# Patient Record
Sex: MALE | Race: WHITE | NOT HISPANIC OR LATINO | Employment: FULL TIME | URBAN - METROPOLITAN AREA
[De-identification: names, ages, dates, MRNs, and addresses within clinical notes are randomized per-mention and may not be internally consistent; named-entity substitution may affect disease eponyms.]

---

## 2017-11-07 ENCOUNTER — TELEPHONE (OUTPATIENT)
Dept: ORTHOPEDIC SURGERY | Facility: CLINIC | Age: 47
End: 2017-11-07

## 2017-11-07 NOTE — TELEPHONE ENCOUNTER
Probably best to put him in with whichever physician has the first available I OV appointment slot

## 2017-11-07 NOTE — TELEPHONE ENCOUNTER
He is NOT my patient.  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  I did not do his surgery.  I have never seen him before or since.    He has requested to see Dr. Alcazar.  He had, at the time I saw him, had more than one dislocation of the hip.     I will respectfully decline.  I cannot see the patient in a timely fashion within the next 2 weeks, anyway.

## 2017-11-13 ENCOUNTER — OFFICE VISIT (OUTPATIENT)
Dept: ORTHOPEDIC SURGERY | Facility: CLINIC | Age: 47
End: 2017-11-13

## 2017-11-13 VITALS — TEMPERATURE: 98 F | HEIGHT: 70 IN | WEIGHT: 152 LBS | BODY MASS INDEX: 21.76 KG/M2

## 2017-11-13 DIAGNOSIS — S89.92XA LEFT KNEE INJURY, INITIAL ENCOUNTER: Primary | ICD-10-CM

## 2017-11-13 PROCEDURE — 20610 DRAIN/INJ JOINT/BURSA W/O US: CPT | Performed by: ORTHOPAEDIC SURGERY

## 2017-11-13 PROCEDURE — 73562 X-RAY EXAM OF KNEE 3: CPT | Performed by: ORTHOPAEDIC SURGERY

## 2017-11-13 PROCEDURE — 99203 OFFICE O/P NEW LOW 30 MIN: CPT | Performed by: ORTHOPAEDIC SURGERY

## 2017-11-13 RX ORDER — LOSARTAN POTASSIUM 100 MG/1
100 TABLET ORAL
COMMUNITY

## 2017-11-13 RX ORDER — LIDOCAINE HYDROCHLORIDE 10 MG/ML
2 INJECTION, SOLUTION INFILTRATION; PERINEURAL
Status: COMPLETED | OUTPATIENT
Start: 2017-11-13 | End: 2017-11-13

## 2017-11-13 RX ORDER — METHYLPREDNISOLONE ACETATE 80 MG/ML
160 INJECTION, SUSPENSION INTRA-ARTICULAR; INTRALESIONAL; INTRAMUSCULAR; SOFT TISSUE
Status: COMPLETED | OUTPATIENT
Start: 2017-11-13 | End: 2017-11-13

## 2017-11-13 RX ORDER — BUPIVACAINE HYDROCHLORIDE 5 MG/ML
2 INJECTION, SOLUTION PERINEURAL
Status: COMPLETED | OUTPATIENT
Start: 2017-11-13 | End: 2017-11-13

## 2017-11-13 RX ADMIN — BUPIVACAINE HYDROCHLORIDE 2 ML: 5 INJECTION, SOLUTION PERINEURAL at 21:33

## 2017-11-13 RX ADMIN — METHYLPREDNISOLONE ACETATE 160 MG: 80 INJECTION, SUSPENSION INTRA-ARTICULAR; INTRALESIONAL; INTRAMUSCULAR; SOFT TISSUE at 21:33

## 2017-11-13 RX ADMIN — LIDOCAINE HYDROCHLORIDE 2 ML: 10 INJECTION, SOLUTION INFILTRATION; PERINEURAL at 21:33

## 2017-11-14 NOTE — PROGRESS NOTES
Patient: Rosalio Marcelino    YOB: 1970    Medical Record Number: [id number]    Chief Complaints:  Left knee injury    History of Present Illness:     47 y.o. male patient who presents for evaluation of his left knee.  He slipped on some ice at work, landing awkwardly on the left knee.  This injury happened in late October.  He tells me that he had severe pain and swelling after the accident.  The pain has tapered down to more of a moderate constant aching pain although he still experiences occasional stabbing and/or grinding pain with certain movements.  The swelling has persisted although is modestly improved.  He does report intermittent clicking and popping in the knee.  Symptoms are worse with sitting and walking.  Rest and ice have both helped somewhat.  Of note, he did have some mild problems with the left knee even prior to this incident.  The symptoms started about 8 months ago.  He did have one injection which helped tremendously.  He also has a history of bilateral hip avascular necrosis for which he has previously got undergone bilateral total hip arthroplasties.    Allergies: No Known Allergies    Home Medications:      Current Outpatient Prescriptions:   •  Indomethacin 40 MG capsule, Take 50 mg by mouth 2 (Two) Times a Day., Disp: , Rfl:   •  losartan (COZAAR) 100 MG tablet, Take 100 mg by mouth., Disp: , Rfl:     Past Medical History:   Diagnosis Date   • H/O bilateral hip replacements    • Joint pain    • Muscle pain    • Stiffness in joint        Past Surgical History:   Procedure Laterality Date   • HIP SURGERY         Social History     Occupational History   • Not on file.     Social History Main Topics   • Smoking status: Never Smoker   • Smokeless tobacco: Not on file   • Alcohol use Yes   • Drug use: No   • Sexual activity: Defer      Social History     Social History Narrative   • No narrative on file       Family History   Problem Relation Age of Onset   • Hypertension Mother   "  • Hypertension Father    • No Known Problems Sister    • No Known Problems Brother    • No Known Problems Maternal Aunt    • No Known Problems Maternal Uncle    • No Known Problems Paternal Aunt    • No Known Problems Paternal Uncle    • Hypertension Maternal Grandmother    • Hypertension Maternal Grandfather    • No Known Problems Paternal Grandmother    • No Known Problems Paternal Grandfather    • Anesthesia problems Neg Hx    • Broken bones Neg Hx    • Cancer Neg Hx    • Clotting disorder Neg Hx    • Collagen disease Neg Hx    • Diabetes Neg Hx    • Dislocations Neg Hx    • Osteoporosis Neg Hx    • Rheumatologic disease Neg Hx    • Scoliosis Neg Hx    • Severe sprains Neg Hx        Review of Systems:      Constitutional: Denies fever, shaking or chills   Eyes: Denies change in visual acuity   HEENT: Denies nasal congestion or sore throat   Respiratory: Denies cough or shortness of breath   Cardiovascular: Denies chest pain or edema  Endocrine: Denies tremors, palpitations, intolerance of heat or cold, polyuria, polydipsia.  GI: Denies abdominal pain, nausea, vomiting, bloody stools or diarrhea  : Denies frequency, urgency, incontinence, retention, or nocturia.  Musculoskeletal: Denies numbness tingling or loss of motor function except as above  Integument: Denies rash, lesion or ulceration   Neurologic: Denies headache or focal weakness, deficits  Heme: Denies epistaxis, spontaneous or excessive bleeding, epistaxis, hematuria, melena, fatigue, enlarged or tender lymph nodes.      All other pertinent positives and negatives as noted above in HPI.      Physical Exam: 47 y.o. male    Vitals:    11/13/17 1518   Temp: 98 °F (36.7 °C)   TempSrc: Temporal Artery    Weight: 152 lb (68.9 kg)   Height: 70\" (177.8 cm)       General:  Patient is awake and alert.  Appears in no acute distress or discomfort.    Psych:  Affect and demeanor are appropriate.    Eyes:  Conjunctiva and sclera appear grossly normal.  Eyes track " well and EOM seem to be intact.    Ears:  No gross abnormalities.  Hearing adequate for the exam.    Cardiovascular:  Regular rate and rhythm.    Lungs:  Good chest expansion.  Breathing unlabored.    Lymph:  No palpable masses or adenopathy in the affected extremity    Extremities:  The left knee is examined.  Skin is benign.  There is a moderate effusion.  Moderate to severe medial joint line tenderness.  Positive medial Angela's.  Knee motion is from near full extension to approximately 115° of flexion.  No instability.  Good strength with hip flexion and knee extension as well as plantar flexion and dorsiflexion of the ankle and toes.  Sensation is intact and subjectively normal throughout the leg.  Palpable pedal pulses.  Brisk cap refill.         Radiology:   Bilateral standing AP views and knees, bilateral merchants and a left knee lateral x-ray are ordered and reviewed.  No comparison films are available.  He has mild diffuse degenerative changes.  There is questionable avascular necrosis of the lateral femoral condyle bilaterally.  I do not see any acute abnormalities, malalignment or other concerning findings.    Assessment/Plan:  Left knee pain and effusion, probable medial compartment bone bruise versus meniscal tear    We discussed options in detail.  At this point, my feeling is there are really 2 options for him.  The first is to try some conservative treatment with relative rest, icing as needed, anti-inflammatories and possibly an injection.  The second option would be to go ahead and refer him for an MRI.  He has clearly had a significant intra-articular injury as evidenced by his effusion.  The MRI would help to determine exactly what structures are injured and then we could determine the best course of action pending review of that study.  On the other hand, he is not having sampson catching or locking and I think that a trial of conservative treatment is reasonable for him.  We talked it over.   He would like to try getting the injection repeated.  The risks, benefits, and alternatives were discussed.  He consented.  I'm going to put him on work restrictions.  I will see him back in 1 month.  If he is better at that time then I will release him to go back to work without restriction.  Otherwise, we may revisit the option of working this up further.    Large Joint Arthrocentesis  Date/Time: 11/13/2017 9:33 PM  Consent given by: patient  Site marked: site marked  Timeout: Immediately prior to procedure a time out was called to verify the correct patient, procedure, equipment, support staff and site/side marked as required   Supporting Documentation  Indications: pain and joint swelling   Procedure Details  Location: knee - L knee  Preparation: Patient was prepped and draped in the usual sterile fashion  Needle size: 25 G  Approach: anterolateral  Medications administered: 2 mL lidocaine 1 %; 160 mg methylPREDNISolone acetate 80 MG/ML; 2 mL bupivacaine 0.5 %  Patient tolerance: patient tolerated the procedure well with no immediate complications            Carlos Karimi MD    11/13/2017    CC to HOLDEN Perea